# Patient Record
Sex: MALE | URBAN - METROPOLITAN AREA
[De-identification: names, ages, dates, MRNs, and addresses within clinical notes are randomized per-mention and may not be internally consistent; named-entity substitution may affect disease eponyms.]

---

## 2019-01-04 ENCOUNTER — OFFICE VISIT (OUTPATIENT)
Dept: URBAN - METROPOLITAN AREA CLINIC 23 | Facility: CLINIC | Age: 74
End: 2019-01-04
Payer: MEDICARE

## 2019-01-04 PROCEDURE — 92134 CPTRZ OPH DX IMG PST SGM RTA: CPT | Performed by: OPHTHALMOLOGY

## 2019-01-04 PROCEDURE — 92004 COMPRE OPH EXAM NEW PT 1/>: CPT | Performed by: OPHTHALMOLOGY

## 2019-01-04 PROCEDURE — 67028 INJECTION EYE DRUG: CPT | Performed by: OPHTHALMOLOGY

## 2019-01-04 ASSESSMENT — INTRAOCULAR PRESSURE
OS: 19
OD: 15

## 2019-01-04 NOTE — IMPRESSION/PLAN
Impression: Trib rtnl vein occlusion, right eye, with macular edema: H34.8310. OD. Condition: improving. Vision: vision affected. last SOPHY w/Eylea OD 12/2018, 11/2018, 10/2018 with Harry Bernal MD in [address]: Discussed diagnosis in detail with patient. Discussed risks of progression. Based on today's exam, diagnostic studies and review of records, recommend to continue with SOPHY tx to help reduce the swelling in order to prevent a further reduction in vision. An examination that was significantly and separately identifiable from the procedure was performed today. Discussed the risks and benefits of tx. Patient elects to proceed with recommendation. OCT shows minimal edema sup macula region OD.

## 2019-01-04 NOTE — IMPRESSION/PLAN
Impression: Age-related nuclear cataract, bilateral: H25.13. OU. Condition: stable. Plan: Discussed diagnosis in detail with patient. No treatment is required at this time. Will continue to observe condition and or symptoms.

## 2019-02-15 ENCOUNTER — OFFICE VISIT (OUTPATIENT)
Dept: URBAN - METROPOLITAN AREA CLINIC 23 | Facility: CLINIC | Age: 74
End: 2019-02-15
Payer: MEDICARE

## 2019-02-15 PROCEDURE — 92134 CPTRZ OPH DX IMG PST SGM RTA: CPT | Performed by: OPHTHALMOLOGY

## 2019-02-15 PROCEDURE — 99213 OFFICE O/P EST LOW 20 MIN: CPT | Performed by: OPHTHALMOLOGY

## 2019-02-15 PROCEDURE — 67028 INJECTION EYE DRUG: CPT | Performed by: OPHTHALMOLOGY

## 2019-02-15 ASSESSMENT — INTRAOCULAR PRESSURE
OS: 14
OD: 14

## 2019-02-15 NOTE — IMPRESSION/PLAN
Impression: Trib rtnl vein occlusion, right eye, with macular edema: H34.8310. OD. Condition: improving. Vision: vision affected. s/p AV OD 1/4/2019 (#1 @ [facility])
last SOPHY w/Eylea OD 12/2018, 11/2018, 10/2018 with Angie Cabral MD in [address]: Discussed diagnosis in detail with patient. Discussed risks of progression. Based on today's exam, diagnostic studies and review of records, recommend to continue with SOPHY tx to help reduce the swelling/bleeding in order to prevent a further reduction in vision. An examination that was significantly and separately identifiable from the procedure was performed today. Discussed the risks and benefits of tx. Patient elects to proceed with recommendation.  OCT shows reduction in fluid OD, stable OS

## 2019-04-12 ENCOUNTER — OFFICE VISIT (OUTPATIENT)
Dept: URBAN - METROPOLITAN AREA CLINIC 23 | Facility: CLINIC | Age: 74
End: 2019-04-12
Payer: MEDICARE

## 2019-04-12 DIAGNOSIS — H04.123 DRY EYE SYNDROME OF BILATERAL LACRIMAL GLANDS: ICD-10-CM

## 2019-04-12 DIAGNOSIS — H25.13 AGE-RELATED NUCLEAR CATARACT, BILATERAL: ICD-10-CM

## 2019-04-12 PROCEDURE — 67028 INJECTION EYE DRUG: CPT | Performed by: OPHTHALMOLOGY

## 2019-04-12 PROCEDURE — 99213 OFFICE O/P EST LOW 20 MIN: CPT | Performed by: OPHTHALMOLOGY

## 2019-04-12 PROCEDURE — 92134 CPTRZ OPH DX IMG PST SGM RTA: CPT | Performed by: OPHTHALMOLOGY

## 2019-04-12 ASSESSMENT — INTRAOCULAR PRESSURE
OS: 17
OD: 16

## 2019-04-12 NOTE — IMPRESSION/PLAN
Impression: Trib rtnl vein occlusion, right eye, with macular edema: H34.8310. OD. Condition: worsening. Vision: vision affected. s/p AV OD #2 02/15/2019, 1/4/2019 w/Dr Hines. last SOPHY w/Eylea OD 12/2018, 11/2018, 10/2018 with Emilia Celaya MD in [address]: Discussed diagnosis in detail with patient. Discussed risks of progression. Based on today's exam, diagnostic studies and review of records, recommend to continue with SOPHY tx to help reduce the swelling/bleeding in order to prevent a further reduction in vision. An examination that was significantly and separately identifiable from the procedure was performed today. Discussed the risks and benefits of tx. Patient elects to proceed with recommendation. OCT shows an increase in edema OD.

## 2019-11-25 ENCOUNTER — OFFICE VISIT (OUTPATIENT)
Dept: URBAN - METROPOLITAN AREA CLINIC 23 | Facility: CLINIC | Age: 74
End: 2019-11-25
Payer: MEDICARE

## 2019-11-25 PROCEDURE — 99213 OFFICE O/P EST LOW 20 MIN: CPT | Performed by: OPHTHALMOLOGY

## 2019-11-25 PROCEDURE — 92134 CPTRZ OPH DX IMG PST SGM RTA: CPT | Performed by: OPHTHALMOLOGY

## 2019-11-25 ASSESSMENT — INTRAOCULAR PRESSURE
OS: 15
OD: 15

## 2019-12-06 ENCOUNTER — PROCEDURE (OUTPATIENT)
Dept: URBAN - METROPOLITAN AREA CLINIC 23 | Facility: CLINIC | Age: 74
End: 2019-12-06
Payer: MEDICARE

## 2019-12-06 PROCEDURE — 67028 INJECTION EYE DRUG: CPT | Performed by: OPHTHALMOLOGY

## 2020-01-03 ENCOUNTER — OFFICE VISIT (OUTPATIENT)
Dept: URBAN - METROPOLITAN AREA CLINIC 23 | Facility: CLINIC | Age: 75
End: 2020-01-03
Payer: MEDICARE

## 2020-01-03 DIAGNOSIS — H43.811 VITREOUS DEGENERATION, RIGHT EYE: ICD-10-CM

## 2020-01-03 PROCEDURE — 67028 INJECTION EYE DRUG: CPT | Performed by: OPHTHALMOLOGY

## 2020-01-03 PROCEDURE — 99213 OFFICE O/P EST LOW 20 MIN: CPT | Performed by: OPHTHALMOLOGY

## 2020-01-03 PROCEDURE — 92134 CPTRZ OPH DX IMG PST SGM RTA: CPT | Performed by: OPHTHALMOLOGY

## 2020-01-03 ASSESSMENT — INTRAOCULAR PRESSURE
OS: 13
OD: 13

## 2020-01-03 NOTE — IMPRESSION/PLAN
Impression: Trib rtnl vein occlusion, right eye, with macular edema: H34.8310. OD. Condition: unstable but improving. Vision affected. s/p AV OD #3  12/06/2019, AV OD #2 02/15/2019, 1/4/2019 w/Dr Hines. Multiple tx's while away, last SOPHY OD 11/01/2019 with Roxana Cranker, MD in [address]: Discussed diagnosis in detail with patient. Discussed risks of progression. Based on today's exam, diagnostic studies and review of records, recommend to continue with SOPHY tx to help reduce the fluid in order to prevent a further reduction in vision. An examination that was significantly and separately identifiable from the procedure was performed today. Discussed the risks and benefits of tx. Patient elects to proceed with recommendation. OCT shows mild persistent edema OD.

## 2020-02-14 ENCOUNTER — OFFICE VISIT (OUTPATIENT)
Dept: URBAN - METROPOLITAN AREA CLINIC 23 | Facility: CLINIC | Age: 75
End: 2020-02-14
Payer: MEDICARE

## 2020-02-14 PROCEDURE — 99213 OFFICE O/P EST LOW 20 MIN: CPT | Performed by: OPHTHALMOLOGY

## 2020-02-14 PROCEDURE — 92134 CPTRZ OPH DX IMG PST SGM RTA: CPT | Performed by: OPHTHALMOLOGY

## 2020-02-14 ASSESSMENT — INTRAOCULAR PRESSURE
OD: 13
OS: 13

## 2020-02-14 NOTE — IMPRESSION/PLAN
Impression: Trib rtnl vein occlusion, right eye, with macular edema: H34.8310. OD. Condition: improving. Vision affected. s/p AV OD 01/03/2020, AV OD #3  12/06/2019, AV OD #2 02/15/2019, 1/4/2019 w/Dr Hines. Multiple tx's while away, last SOPHY OD 11/01/2019 with Maylin Ordoñez MD in [address]: Discussed diagnosis in detail with patient. Exam OD shows a decrease in edema and OCT confirms findings. No treatment is required at this time based on exam and OCT. Recommend observation for now. Will reassess condition in 6 wks.

## 2020-04-13 ENCOUNTER — OFFICE VISIT (OUTPATIENT)
Dept: URBAN - METROPOLITAN AREA CLINIC 23 | Facility: CLINIC | Age: 75
End: 2020-04-13
Payer: MEDICARE

## 2020-04-13 DIAGNOSIS — H34.8310 TRIB RTNL VEIN OCCLUSION, RIGHT EYE, WITH MACULAR EDEMA: Primary | ICD-10-CM

## 2020-04-13 PROCEDURE — 99213 OFFICE O/P EST LOW 20 MIN: CPT | Performed by: OPHTHALMOLOGY

## 2020-04-13 PROCEDURE — 92134 CPTRZ OPH DX IMG PST SGM RTA: CPT | Performed by: OPHTHALMOLOGY

## 2020-04-13 ASSESSMENT — INTRAOCULAR PRESSURE
OS: 13
OD: 13

## 2020-04-13 NOTE — IMPRESSION/PLAN
Impression: Trib rtnl vein occlusion, right eye, with macular edema: H34.8310. OD. Condition: unstable. Vision affected. s/p AV OD 01/03/2020, AV OD #3  12/06/2019, AV OD #2 02/15/2019, 1/4/2019 w/Dr Hines. Multiple tx's while away, last SOPHY OD 11/01/2019 with Melyssa Cornell MD in [place] Plan: Due to [address] COVID-19 pandemic and National Emergency, deferred Slit Lamp examination. Findings are based on OCT and Optos. OCT shows  increased edema OD and Optos shows increased edema OD. Based on today's diagnostic studies and review of records, recommend to continue with SOPHY tx AV OD in order to help reduce the swelling and prevent a further reduction in vision. Discussed the risks and benefits of tx. Patient elects to proceed with recommendation. Patient may need additional SOPHY tx or laser treatment in the future.

## 2020-04-24 ENCOUNTER — PROCEDURE (OUTPATIENT)
Dept: URBAN - METROPOLITAN AREA CLINIC 23 | Facility: CLINIC | Age: 75
End: 2020-04-24
Payer: MEDICARE

## 2020-04-24 PROCEDURE — 67028 INJECTION EYE DRUG: CPT | Performed by: OPHTHALMOLOGY

## 2021-02-26 ENCOUNTER — OFFICE VISIT (OUTPATIENT)
Dept: URBAN - METROPOLITAN AREA CLINIC 23 | Facility: CLINIC | Age: 76
End: 2021-02-26
Payer: MEDICARE

## 2021-02-26 PROCEDURE — 67028 INJECTION EYE DRUG: CPT | Performed by: OPHTHALMOLOGY

## 2021-02-26 PROCEDURE — 92134 CPTRZ OPH DX IMG PST SGM RTA: CPT | Performed by: OPHTHALMOLOGY

## 2021-02-26 PROCEDURE — 99213 OFFICE O/P EST LOW 20 MIN: CPT | Performed by: OPHTHALMOLOGY

## 2021-02-26 ASSESSMENT — INTRAOCULAR PRESSURE
OS: 14
OD: 14

## 2021-02-26 NOTE — IMPRESSION/PLAN
Impression: Trib rtnl vein occlusion, right eye, with macular edema: H34.8310. OD. Condition: improving. Vision affected. s/p AV OD 4/24/2020, AV OD 01/03/2020, AV OD #3  12/06/2019. .. w/Dr Carmen Kinney. Multiple tx's while away, last SOPHY OD 1/8/2021 with Lexa Montemayor MD in [place] Plan: Due to Coronavirus COVID-19 pandemic and National Emergency, deferred Slit Lamp examination. Findings are based on OCT and Optos. OCT shows decreased edema, some active leakage OD and Optos OD shows PVD, minimal edema and no significant hemorrhage. Based on today's diagnostic studies and review of records, recommend to continue with SOPHY tx AVASTIN RIGHT EYE in order to help reduce the swelling and prevent a further reduction in vision. Discussed the risks and benefits of tx. Patient elects to proceed with recommendation. Patient may need additional SOPHY tx or laser treatment in the future.

## 2021-04-09 ENCOUNTER — OFFICE VISIT (OUTPATIENT)
Dept: URBAN - METROPOLITAN AREA CLINIC 23 | Facility: CLINIC | Age: 76
End: 2021-04-09
Payer: MEDICARE

## 2021-04-09 PROCEDURE — 67028 INJECTION EYE DRUG: CPT | Performed by: OPHTHALMOLOGY

## 2021-04-09 PROCEDURE — 92134 CPTRZ OPH DX IMG PST SGM RTA: CPT | Performed by: OPHTHALMOLOGY

## 2021-04-09 ASSESSMENT — INTRAOCULAR PRESSURE
OS: 13
OD: 13

## 2021-04-09 NOTE — IMPRESSION/PLAN
Impression: Trib rtnl vein occlusion, right eye, with macular edema: H34.8310. OD. Condition: improving. Vision affected. s/p AV OD 02/26/2021, AV OD 4/24/2020, AV OD 01/03/2020 . .. w/Dr Kurt Vera. Multiple tx's while away, last SOPHY OD 1/8/2021 with Abena Riggs MD in [place] Plan: Due to Coronavirus COVID-19 pandemic and National Emergency, deferred Slit Lamp examination. Findings are based on OCT and Optos. OCT and Optos OD shows decreased in edema. Based on today's diagnostic studies and review of records, recommend to continue with SOPHY tx AVASTIN RIGHT EYE in order to further reduce the swelling and prevent a further reduction in vision. Discussed the risks and benefits of tx. Patient elects to proceed with recommendation. Patient is returning back home and will continue his treatment with Dr Sandeep Jesus in . [place].